# Patient Record
Sex: FEMALE | Race: WHITE | URBAN - METROPOLITAN AREA
[De-identification: names, ages, dates, MRNs, and addresses within clinical notes are randomized per-mention and may not be internally consistent; named-entity substitution may affect disease eponyms.]

---

## 2022-08-19 ENCOUNTER — OFFICE VISIT (OUTPATIENT)
Dept: URBAN - METROPOLITAN AREA MEDICAL CENTER 28 | Facility: MEDICAL CENTER | Age: 67
End: 2022-08-19
Payer: MEDICARE

## 2022-08-19 ENCOUNTER — LAB OUTSIDE AN ENCOUNTER (OUTPATIENT)
Dept: URBAN - METROPOLITAN AREA CLINIC 96 | Facility: CLINIC | Age: 67
End: 2022-08-19

## 2022-08-19 DIAGNOSIS — K31.89 ACQUIRED DEFORMITY OF DUODENUM: ICD-10-CM

## 2022-08-19 DIAGNOSIS — K22.89 DILATATION OF ESOPHAGUS: ICD-10-CM

## 2022-08-19 DIAGNOSIS — R63.4 ABNORMAL INTENTIONAL WEIGHT LOSS: ICD-10-CM

## 2022-08-19 DIAGNOSIS — R11.2 ACUTE NAUSEA WITH NONBILIOUS VOMITING: ICD-10-CM

## 2022-08-19 LAB
GLUCOSE POC: 86
PERFORMING LAB: (no result)

## 2022-08-19 PROCEDURE — 43239 EGD BIOPSY SINGLE/MULTIPLE: CPT | Performed by: INTERNAL MEDICINE

## 2023-01-09 ENCOUNTER — OUT OF OFFICE VISIT (OUTPATIENT)
Dept: URBAN - METROPOLITAN AREA MEDICAL CENTER 28 | Facility: MEDICAL CENTER | Age: 68
End: 2023-01-09
Payer: MEDICARE

## 2023-01-09 DIAGNOSIS — K63.3 APHTHOUS ULCER OF COLON: ICD-10-CM

## 2023-01-09 DIAGNOSIS — R63.0 ALMOST NO APPETITE: ICD-10-CM

## 2023-01-09 DIAGNOSIS — R19.7 ACUTE DIARRHEA: ICD-10-CM

## 2023-01-09 PROCEDURE — 45380 COLONOSCOPY AND BIOPSY: CPT | Performed by: INTERNAL MEDICINE

## 2023-01-09 PROCEDURE — 43239 EGD BIOPSY SINGLE/MULTIPLE: CPT | Performed by: INTERNAL MEDICINE

## 2023-01-26 ENCOUNTER — CLAIMS CREATED FROM THE CLAIM WINDOW (OUTPATIENT)
Dept: URBAN - METROPOLITAN AREA MEDICAL CENTER 28 | Facility: MEDICAL CENTER | Age: 68
End: 2023-01-26
Payer: MEDICARE

## 2023-01-26 ENCOUNTER — OUT OF OFFICE VISIT (OUTPATIENT)
Dept: URBAN - METROPOLITAN AREA MEDICAL CENTER 28 | Facility: MEDICAL CENTER | Age: 68
End: 2023-01-26

## 2023-01-26 DIAGNOSIS — D64.89 ANEMIA DUE TO OTHER CAUSE: ICD-10-CM

## 2023-01-26 DIAGNOSIS — K92.1 ACUTE MELENA: ICD-10-CM

## 2023-01-26 PROCEDURE — G8427 DOCREV CUR MEDS BY ELIG CLIN: HCPCS | Performed by: PHYSICIAN ASSISTANT

## 2023-01-26 PROCEDURE — 99223 1ST HOSP IP/OBS HIGH 75: CPT | Performed by: PHYSICIAN ASSISTANT

## 2023-01-27 ENCOUNTER — OUT OF OFFICE VISIT (OUTPATIENT)
Dept: URBAN - METROPOLITAN AREA MEDICAL CENTER 28 | Facility: MEDICAL CENTER | Age: 68
End: 2023-01-27

## 2023-01-27 ENCOUNTER — CLAIMS CREATED FROM THE CLAIM WINDOW (OUTPATIENT)
Dept: URBAN - METROPOLITAN AREA MEDICAL CENTER 28 | Facility: MEDICAL CENTER | Age: 68
End: 2023-01-27
Payer: MEDICARE

## 2023-01-27 DIAGNOSIS — K92.1 ACUTE MELENA: ICD-10-CM

## 2023-01-27 DIAGNOSIS — K63.3 COLON ULCER: ICD-10-CM

## 2023-01-27 DIAGNOSIS — K31.89 ACQUIRED DEFORMITY OF DUODENUM: ICD-10-CM

## 2023-01-27 DIAGNOSIS — K62.5 ANAL BLEEDING: ICD-10-CM

## 2023-01-27 PROCEDURE — 43239 EGD BIOPSY SINGLE/MULTIPLE: CPT | Performed by: INTERNAL MEDICINE

## 2023-01-27 PROCEDURE — 45380 COLONOSCOPY AND BIOPSY: CPT | Performed by: INTERNAL MEDICINE

## 2023-08-11 ENCOUNTER — TELEPHONE ENCOUNTER (OUTPATIENT)
Dept: URBAN - METROPOLITAN AREA CLINIC 12 | Facility: CLINIC | Age: 68
End: 2023-08-11

## 2023-08-14 PROBLEM — 2631000119108: Status: ACTIVE | Noted: 2023-08-14

## 2023-08-17 ENCOUNTER — LAB OUTSIDE AN ENCOUNTER (OUTPATIENT)
Dept: URBAN - METROPOLITAN AREA CLINIC 98 | Facility: CLINIC | Age: 68
End: 2023-08-17

## 2023-08-17 ENCOUNTER — OFFICE VISIT (OUTPATIENT)
Dept: URBAN - METROPOLITAN AREA CLINIC 98 | Facility: CLINIC | Age: 68
End: 2023-08-17
Payer: MEDICARE

## 2023-08-17 ENCOUNTER — WEB ENCOUNTER (OUTPATIENT)
Dept: URBAN - METROPOLITAN AREA CLINIC 98 | Facility: CLINIC | Age: 68
End: 2023-08-17

## 2023-08-17 VITALS
HEIGHT: 64 IN | BODY MASS INDEX: 31.24 KG/M2 | DIASTOLIC BLOOD PRESSURE: 57 MMHG | HEART RATE: 53 BPM | WEIGHT: 183 LBS | SYSTOLIC BLOOD PRESSURE: 107 MMHG | TEMPERATURE: 97.7 F

## 2023-08-17 DIAGNOSIS — R93.2 ABNORMAL FINDINGS ON DIAGNOSTIC IMAGING OF LIVER AND BILIARY TRACT: ICD-10-CM

## 2023-08-17 DIAGNOSIS — Z94.81 BONE MARROW REPLACED BY TRANSPLANT: ICD-10-CM

## 2023-08-17 PROCEDURE — 993 AGA: Performed by: INTERNAL MEDICINE

## 2023-08-17 PROCEDURE — 992 NON-BILLABLE: Performed by: INTERNAL MEDICINE

## 2023-08-17 NOTE — HPI-TODAY'S VISIT:
HEre on kind referral from Dr Rodrigues for abnormal liver imaging and ascites Brownyn her  is here  has had swelling since April in belly and legs no prior knowledge of liver disease still on ECP  family : twin sister w cirrhosis - heavy  alcohol,   20y ago  brothers with liver disease - . HCV and alcohol alcohol use was rare, she says  . on only lasix 80mg bid and juan 50mg daily. . having diarrhea x 1 wk : infectious workup neg.  10-15 bm /day

## 2023-08-17 NOTE — HPI-OTHER HISTORIES
7/2023 [facility]   REPORT ULTRASOUND RIGHT UPPER QUADRANT ABDOMEN  CLINICAL HISTORY: Elevated liver enzymes. Abnormal liver function tests. R 74.01  TECHNIQUE: Sonographic evaluation of the right upper quadrant of the abdomen was performed utilizing grayscale, color and spectral Doppler techniques.  COMPARISON: No prior study for comparison.  FINDINGS:  Liver:Normal homogeneous echotexture and contour. No definite focal masses. Normal hepatopedal flow documented in the portal vein. Flow also documented within a branch of the hepatic vein. The liver cnjnuntg80.5 cm long.  Intra and Extrahepatic Bile Ducts: Normal.  CBD measures 0.2 cm..   Gallbladder: Suspect a small amount of gallbladder sludge. No gallstones or pericholecystic fluid. No gallbladder wall thickening.  Pancreas: The visualized portions of pancreas appear normal. The pancreatic tail is obscured by bowel gas.  Right Kidney: Measures 11.1 cm long by 5.3 cm AP by 6.6 cm transverse with cortical thickness of 1.5 cm. Normal size and echogenicity. No hydronephrosis. No masses. No calculi.  Ascites: None.  Aorta and inferior vena: The aorta and inferior vena cava visualized in the upper abdomen appear normal. The proximal abdominal aorta measures 2.4 cm AP.  IMPRESSION:   Small amount of gallbladder sludge suspected. No cholelithiasis or biliary tract dilatation.

## 2023-08-18 ENCOUNTER — OUT OF OFFICE VISIT (OUTPATIENT)
Dept: URBAN - METROPOLITAN AREA MEDICAL CENTER 28 | Facility: MEDICAL CENTER | Age: 68
End: 2023-08-18
Payer: MEDICARE

## 2023-08-18 DIAGNOSIS — R18.8 ABDOMINAL ASCITES: ICD-10-CM

## 2023-08-18 DIAGNOSIS — Z94.84 H/O STEM CELL TRANSPLANT: ICD-10-CM

## 2023-08-18 DIAGNOSIS — K74.69 CIRRHOSIS, CRYPTOGENIC: ICD-10-CM

## 2023-08-18 DIAGNOSIS — K76.6 CLINICALLY SIGNIFICANT PORTAL HYPERTENSION: ICD-10-CM

## 2023-08-18 PROCEDURE — 99233 SBSQ HOSP IP/OBS HIGH 50: CPT | Performed by: INTERNAL MEDICINE

## 2023-08-18 PROCEDURE — G8427 DOCREV CUR MEDS BY ELIG CLIN: HCPCS | Performed by: INTERNAL MEDICINE

## 2023-08-18 PROCEDURE — 99223 1ST HOSP IP/OBS HIGH 75: CPT | Performed by: INTERNAL MEDICINE

## 2023-08-28 ENCOUNTER — OUT OF OFFICE VISIT (OUTPATIENT)
Dept: URBAN - METROPOLITAN AREA MEDICAL CENTER 28 | Facility: MEDICAL CENTER | Age: 68
End: 2023-08-28
Payer: MEDICARE

## 2023-08-28 DIAGNOSIS — T86.5 COMPLICATIONS OF STEM CELL TRANSPLANT: ICD-10-CM

## 2023-08-28 DIAGNOSIS — Z94.84 H/O STEM CELL TRANSPLANT: ICD-10-CM

## 2023-08-28 DIAGNOSIS — K74.69 CIRRHOSIS, CRYPTOGENIC: ICD-10-CM

## 2023-08-28 DIAGNOSIS — D89.811 CHRONIC GRAFT-VERSUS-HOST DISEASE: ICD-10-CM

## 2023-08-28 DIAGNOSIS — R18.8 ABDOMINAL ASCITES: ICD-10-CM

## 2023-08-28 PROCEDURE — 99232 SBSQ HOSP IP/OBS MODERATE 35: CPT | Performed by: INTERNAL MEDICINE

## 2023-09-07 ENCOUNTER — OFFICE VISIT (OUTPATIENT)
Dept: URBAN - METROPOLITAN AREA CLINIC 98 | Facility: CLINIC | Age: 68
End: 2023-09-07
Payer: MEDICARE

## 2023-09-07 VITALS
TEMPERATURE: 98.2 F | HEART RATE: 64 BPM | DIASTOLIC BLOOD PRESSURE: 53 MMHG | HEIGHT: 64 IN | BODY MASS INDEX: 24.24 KG/M2 | SYSTOLIC BLOOD PRESSURE: 117 MMHG | WEIGHT: 142 LBS

## 2023-09-07 DIAGNOSIS — Z94.81 BONE MARROW REPLACED BY TRANSPLANT: ICD-10-CM

## 2023-09-07 DIAGNOSIS — D89.811 CHRONIC GVHD: ICD-10-CM

## 2023-09-07 DIAGNOSIS — R18.8 ASCITES: ICD-10-CM

## 2023-09-07 DIAGNOSIS — K76.82 HEPATIC ENCEPHALOPATHY: ICD-10-CM

## 2023-09-07 DIAGNOSIS — K74.60 CIRRHOSIS: ICD-10-CM

## 2023-09-07 PROCEDURE — 99214 OFFICE O/P EST MOD 30 MIN: CPT | Performed by: INTERNAL MEDICINE

## 2023-09-07 RX ORDER — RIFAXIMIN 550 MG/1
1 TABLET TABLET ORAL TWICE A DAY
Qty: 180 TABLET | Refills: 3 | OUTPATIENT
Start: 2023-09-08 | End: 2024-09-02

## 2023-09-07 NOTE — HPI-TODAY'S VISIT:
Returns to the office accompanied by Bronwyn seen during her almost 3 wk hospital stay for volume overload  during that time with high NH3 and confusion that was mild and transient  Did have a TJ liver bx w mild portal hypertension, cirrhosis w iron overload, and findings of chronic GVHD   feels well mentation near normal, Bronwyn reports ; she says she has no fluid retnetion and is ambulating well and eating well

## 2023-09-08 ENCOUNTER — TELEPHONE ENCOUNTER (OUTPATIENT)
Dept: URBAN - METROPOLITAN AREA CLINIC 98 | Facility: CLINIC | Age: 68
End: 2023-09-08

## 2023-10-13 ENCOUNTER — OFFICE VISIT (OUTPATIENT)
Dept: URBAN - METROPOLITAN AREA CLINIC 98 | Facility: CLINIC | Age: 68
End: 2023-10-13
Payer: MEDICARE

## 2023-10-13 VITALS
HEART RATE: 47 BPM | TEMPERATURE: 97.4 F | SYSTOLIC BLOOD PRESSURE: 101 MMHG | HEIGHT: 64 IN | BODY MASS INDEX: 21.51 KG/M2 | DIASTOLIC BLOOD PRESSURE: 49 MMHG | WEIGHT: 126 LBS

## 2023-10-13 DIAGNOSIS — D89.811 CHRONIC GVHD: ICD-10-CM

## 2023-10-13 DIAGNOSIS — K76.82 HEPATIC ENCEPHALOPATHY: ICD-10-CM

## 2023-10-13 DIAGNOSIS — K74.60 CIRRHOSIS: ICD-10-CM

## 2023-10-13 DIAGNOSIS — Z94.81 BONE MARROW REPLACED BY TRANSPLANT: ICD-10-CM

## 2023-10-13 DIAGNOSIS — R18.8 ASCITES: ICD-10-CM

## 2023-10-13 PROCEDURE — 99214 OFFICE O/P EST MOD 30 MIN: CPT | Performed by: INTERNAL MEDICINE

## 2023-10-13 RX ORDER — LACTULOSE 10 G/15ML
30 ML SOLUTION ORAL ONCE A DAY
Qty: 2700 ML | Refills: 1 | OUTPATIENT
Start: 2023-10-13 | End: 2024-04-10

## 2023-10-13 NOTE — HPI-TODAY'S VISIT:
Herewith Bronwyn to follow up  still has a few more ECP sessions for GVHD  recalls being told her liver tests are improving  No confusion since leaving hospital out of lactulose; remains on Xifaxan

## 2023-10-16 ENCOUNTER — TELEPHONE ENCOUNTER (OUTPATIENT)
Dept: URBAN - METROPOLITAN AREA CLINIC 98 | Facility: CLINIC | Age: 68
End: 2023-10-16

## 2023-10-16 VITALS — HEIGHT: 64 IN

## 2024-01-10 ENCOUNTER — DASHBOARD ENCOUNTERS (OUTPATIENT)
Age: 69
End: 2024-01-10

## 2024-01-11 ENCOUNTER — LAB OUTSIDE AN ENCOUNTER (OUTPATIENT)
Dept: URBAN - METROPOLITAN AREA CLINIC 98 | Facility: CLINIC | Age: 69
End: 2024-01-11

## 2024-01-11 ENCOUNTER — OFFICE VISIT (OUTPATIENT)
Dept: URBAN - METROPOLITAN AREA CLINIC 98 | Facility: CLINIC | Age: 69
End: 2024-01-11
Payer: MEDICARE

## 2024-01-11 VITALS
WEIGHT: 130 LBS | SYSTOLIC BLOOD PRESSURE: 96 MMHG | DIASTOLIC BLOOD PRESSURE: 54 MMHG | BODY MASS INDEX: 22.2 KG/M2 | HEART RATE: 53 BPM | HEIGHT: 64 IN | TEMPERATURE: 97.8 F

## 2024-01-11 DIAGNOSIS — D89.811 CHRONIC GVHD: ICD-10-CM

## 2024-01-11 DIAGNOSIS — R18.8 ASCITES: ICD-10-CM

## 2024-01-11 DIAGNOSIS — K74.60 CIRRHOSIS: ICD-10-CM

## 2024-01-11 DIAGNOSIS — Z94.81 BONE MARROW REPLACED BY TRANSPLANT: ICD-10-CM

## 2024-01-11 DIAGNOSIS — K76.82 HEPATIC ENCEPHALOPATHY: ICD-10-CM

## 2024-01-11 PROCEDURE — 99214 OFFICE O/P EST MOD 30 MIN: CPT | Performed by: INTERNAL MEDICINE

## 2024-01-11 RX ORDER — LACTULOSE 10 G/15ML
30 ML SOLUTION ORAL ONCE A DAY
Qty: 2700 ML | Refills: 1 | Status: ACTIVE | COMMUNITY
Start: 2023-10-13 | End: 2024-04-10

## 2024-01-11 NOTE — HPI-TODAY'S VISIT:
Here alone to follow up  not a lot of fluid buildup any more  she has brought her labs from BMT dated 1/8 na 136 cr 1 alb 3.5 tbil 0.9 alp 358 ast 45 alt 34 hb 9.4 plt 80

## 2024-04-11 ENCOUNTER — OV EP (OUTPATIENT)
Dept: URBAN - METROPOLITAN AREA CLINIC 98 | Facility: CLINIC | Age: 69
End: 2024-04-11

## 2024-05-02 ENCOUNTER — TELEPHONE ENCOUNTER (OUTPATIENT)
Dept: URBAN - METROPOLITAN AREA CLINIC 98 | Facility: CLINIC | Age: 69
End: 2024-05-02

## 2024-05-02 RX ORDER — RIFAXIMIN 550 MG/1
1 TABLET TABLET ORAL TWICE A DAY
Qty: 180 | Refills: 3
Start: 2023-09-08 | End: 2025-04-27

## 2024-05-03 ENCOUNTER — TELEPHONE ENCOUNTER (OUTPATIENT)
Dept: URBAN - METROPOLITAN AREA CLINIC 98 | Facility: CLINIC | Age: 69
End: 2024-05-03

## 2024-06-24 ENCOUNTER — LAB OUTSIDE AN ENCOUNTER (OUTPATIENT)
Dept: URBAN - METROPOLITAN AREA CLINIC 98 | Facility: CLINIC | Age: 69
End: 2024-06-24

## 2024-06-24 ENCOUNTER — OFFICE VISIT (OUTPATIENT)
Dept: URBAN - METROPOLITAN AREA CLINIC 98 | Facility: CLINIC | Age: 69
End: 2024-06-24
Payer: MEDICARE

## 2024-06-24 VITALS
TEMPERATURE: 97.3 F | WEIGHT: 138 LBS | DIASTOLIC BLOOD PRESSURE: 56 MMHG | SYSTOLIC BLOOD PRESSURE: 107 MMHG | BODY MASS INDEX: 23.56 KG/M2 | HEART RATE: 51 BPM | HEIGHT: 64 IN

## 2024-06-24 DIAGNOSIS — K74.69 CIRRHOSIS, CRYPTOGENIC: ICD-10-CM

## 2024-06-24 DIAGNOSIS — Z94.81 BONE MARROW REPLACED BY TRANSPLANT: ICD-10-CM

## 2024-06-24 PROCEDURE — 99214 OFFICE O/P EST MOD 30 MIN: CPT | Performed by: INTERNAL MEDICINE

## 2024-06-24 RX ORDER — LACTULOSE 10 G/15ML
TAKE 30ML BY MOUTH ONE TIME DAILY SOLUTION ORAL
Qty: 2700 MILLILITER | Refills: 1 | Status: ACTIVE | COMMUNITY

## 2024-06-24 RX ORDER — RIFAXIMIN 550 MG/1
1 TABLET TABLET ORAL TWICE A DAY
Qty: 180 | Refills: 3 | Status: ACTIVE | COMMUNITY
Start: 2023-09-08 | End: 2025-04-27

## 2024-06-24 NOTE — HPI-TODAY'S VISIT:
Here for routine follow up  she reports that she is told now to being in remission  off ECP feels well  did not bring medication list but thinks she is only on lasix for diuretic  feels that her belly is same size as always

## 2024-06-25 LAB
AFP, SERUM, TUMOR MARKER: 4.4
ALBUMIN: 4.1
ALKALINE PHOSPHATASE: 209
ALT (SGPT): 26
AST (SGOT): 39
BASO (ABSOLUTE): 0
BASOS: 1
BILIRUBIN, TOTAL: 0.6
BUN/CREATININE RATIO: 34
BUN: 44
CALCIUM: 9.1
CARBON DIOXIDE, TOTAL: 24
CHLORIDE: 99
CREATININE: 1.29
EGFR: 44
EOS (ABSOLUTE): 0.1
EOS: 5
GLOBULIN, TOTAL: 2.5
GLUCOSE: 222
HEMATOCRIT: 25.5
HEMATOLOGY COMMENTS:: (no result)
HEMOGLOBIN: 8.7
IMMATURE CELLS: (no result)
IMMATURE GRANS (ABS): 0
IMMATURE GRANULOCYTES: 1
INR: 1.2
LYMPHS (ABSOLUTE): 0.6
LYMPHS: 27
MAGNESIUM: 2.3
MCH: 36.3
MCHC: 34.1
MCV: 106
MONOCYTES(ABSOLUTE): 0.3
MONOCYTES: 13
NEUTROPHILS (ABSOLUTE): 1.2
NEUTROPHILS: 53
NRBC: (no result)
PLATELETS: 53
POTASSIUM: 5.1
PROTEIN, TOTAL: 6.6
PROTHROMBIN TIME: 12.1
RBC: 2.4
RDW: 13.7
SODIUM: 135
WBC: 2.2

## 2024-07-02 ENCOUNTER — LAB OUTSIDE AN ENCOUNTER (OUTPATIENT)
Dept: URBAN - METROPOLITAN AREA CLINIC 98 | Facility: CLINIC | Age: 69
End: 2024-07-02

## 2024-07-02 LAB
CREATININE POC: 1.2
PERFORMING LAB: (no result)

## 2024-07-15 ENCOUNTER — TELEPHONE ENCOUNTER (OUTPATIENT)
Dept: URBAN - METROPOLITAN AREA CLINIC 98 | Facility: CLINIC | Age: 69
End: 2024-07-15

## 2024-09-23 ENCOUNTER — OFFICE VISIT (OUTPATIENT)
Dept: URBAN - METROPOLITAN AREA CLINIC 98 | Facility: CLINIC | Age: 69
End: 2024-09-23
Payer: MEDICARE

## 2024-09-23 VITALS
HEIGHT: 64 IN | WEIGHT: 137 LBS | BODY MASS INDEX: 23.39 KG/M2 | HEART RATE: 55 BPM | TEMPERATURE: 98 F | SYSTOLIC BLOOD PRESSURE: 104 MMHG | DIASTOLIC BLOOD PRESSURE: 55 MMHG

## 2024-09-23 DIAGNOSIS — R18.8 ASCITES: ICD-10-CM

## 2024-09-23 DIAGNOSIS — K74.69 CIRRHOSIS, CRYPTOGENIC: ICD-10-CM

## 2024-09-23 DIAGNOSIS — K76.82 HEPATIC ENCEPHALOPATHY: ICD-10-CM

## 2024-09-23 DIAGNOSIS — Z94.81 BONE MARROW REPLACED BY TRANSPLANT: ICD-10-CM

## 2024-09-23 PROCEDURE — 99214 OFFICE O/P EST MOD 30 MIN: CPT | Performed by: INTERNAL MEDICINE

## 2024-09-23 RX ORDER — RIFAXIMIN 550 MG/1
1 TABLET TABLET ORAL TWICE A DAY
Qty: 180 | Refills: 3 | Status: ACTIVE | COMMUNITY
Start: 2023-09-08 | End: 2025-04-27

## 2024-09-23 RX ORDER — LACTULOSE 10 G/15ML
TAKE 30ML BY MOUTH ONE TIME DAILY SOLUTION ORAL
Qty: 2700 MILLILITER | Refills: 1 | Status: ACTIVE | COMMUNITY

## 2024-09-23 NOTE — HPI-TODAY'S VISIT:
Here to follow up  got fluids at last BMT visit  belly is normal sized 9/20 labs Cr1.5 na 134  alb 3.8  tbili 0.8 alp 199 ast 37 alt 27 hb 8.4 plt 69  will stop jakafi at end of the month  only taking lasix PRN (which is rare)  lactulose 30ml daily (3-4 bm daily

## 2024-11-02 ENCOUNTER — ERX REFILL RESPONSE (OUTPATIENT)
Dept: URBAN - METROPOLITAN AREA CLINIC 98 | Facility: CLINIC | Age: 69
End: 2024-11-02

## 2024-11-02 RX ORDER — LACTULOSE 10 G/15ML
TAKE 30ML BY MOUTH ONE TIME DAILY SOLUTION ORAL
Qty: 2700 MILLILITER | Refills: 1 | OUTPATIENT

## 2024-12-16 ENCOUNTER — OFFICE VISIT (OUTPATIENT)
Dept: URBAN - METROPOLITAN AREA CLINIC 98 | Facility: CLINIC | Age: 69
End: 2024-12-16

## 2025-01-31 ENCOUNTER — OFFICE VISIT (OUTPATIENT)
Dept: URBAN - METROPOLITAN AREA CLINIC 98 | Facility: CLINIC | Age: 70
End: 2025-01-31

## 2025-01-31 VITALS
HEART RATE: 74 BPM | BODY MASS INDEX: 23.66 KG/M2 | DIASTOLIC BLOOD PRESSURE: 61 MMHG | WEIGHT: 138.6 LBS | SYSTOLIC BLOOD PRESSURE: 160 MMHG | HEIGHT: 64 IN | TEMPERATURE: 96.9 F

## 2025-01-31 RX ORDER — RIFAXIMIN 550 MG/1
1 TABLET TABLET ORAL TWICE A DAY
Qty: 180 | Refills: 3 | Status: ACTIVE | COMMUNITY
Start: 2023-09-08 | End: 2025-04-27

## 2025-01-31 RX ORDER — LACTULOSE 10 G/15ML
TAKE 30ML BY MOUTH ONE TIME DAILY SOLUTION ORAL
Qty: 2700 MILLILITER | Refills: 1 | Status: ACTIVE | COMMUNITY

## 2025-01-31 NOTE — HPI-TODAY'S VISIT:
Yesterday when she urinated - all blood  stool is brown and loose but sees BRB  stomach is sore in lower area  has been coughing a lot and muscle gets sore from coughing BM has been loose for a few months  she thinks that she might be on a blood thinner

## 2025-02-01 ENCOUNTER — CLAIMS CREATED FROM THE CLAIM WINDOW (OUTPATIENT)
Dept: URBAN - METROPOLITAN AREA MEDICAL CENTER 28 | Facility: MEDICAL CENTER | Age: 70
End: 2025-02-01
Payer: MEDICARE

## 2025-02-01 DIAGNOSIS — K62.5 ANAL BLEEDING: ICD-10-CM

## 2025-02-01 DIAGNOSIS — R31.9 HEMATURIA, UNSPECIFIED TYPE: ICD-10-CM

## 2025-02-01 DIAGNOSIS — R93.3 ABN FINDINGS-GI TRACT: ICD-10-CM

## 2025-02-01 DIAGNOSIS — D64.89 ANEMIA DUE TO OTHER CAUSE: ICD-10-CM

## 2025-02-01 PROCEDURE — 99222 1ST HOSP IP/OBS MODERATE 55: CPT | Performed by: INTERNAL MEDICINE

## 2025-02-01 PROCEDURE — 99254 IP/OBS CNSLTJ NEW/EST MOD 60: CPT | Performed by: INTERNAL MEDICINE

## 2025-02-02 ENCOUNTER — CLAIMS CREATED FROM THE CLAIM WINDOW (OUTPATIENT)
Dept: URBAN - METROPOLITAN AREA MEDICAL CENTER 28 | Facility: MEDICAL CENTER | Age: 70
End: 2025-02-02
Payer: MEDICARE

## 2025-02-02 DIAGNOSIS — K62.5 ANAL BLEEDING: ICD-10-CM

## 2025-02-02 DIAGNOSIS — R93.3 ABN FINDINGS-GI TRACT: ICD-10-CM

## 2025-02-02 DIAGNOSIS — D64.89 ANEMIA DUE TO OTHER CAUSE: ICD-10-CM

## 2025-02-02 PROCEDURE — 99232 SBSQ HOSP IP/OBS MODERATE 35: CPT | Performed by: INTERNAL MEDICINE

## 2025-02-03 ENCOUNTER — CLAIMS CREATED FROM THE CLAIM WINDOW (OUTPATIENT)
Dept: URBAN - METROPOLITAN AREA MEDICAL CENTER 28 | Facility: MEDICAL CENTER | Age: 70
End: 2025-02-03
Payer: MEDICARE

## 2025-02-03 DIAGNOSIS — K62.5 ANAL BLEEDING: ICD-10-CM

## 2025-02-03 PROCEDURE — 45380 COLONOSCOPY AND BIOPSY: CPT | Performed by: INTERNAL MEDICINE

## 2025-02-03 PROCEDURE — 45378 DIAGNOSTIC COLONOSCOPY: CPT | Performed by: INTERNAL MEDICINE

## 2025-02-04 ENCOUNTER — CLAIMS CREATED FROM THE CLAIM WINDOW (OUTPATIENT)
Dept: URBAN - METROPOLITAN AREA MEDICAL CENTER 28 | Facility: MEDICAL CENTER | Age: 70
End: 2025-02-04
Payer: MEDICARE

## 2025-02-04 DIAGNOSIS — K62.5 ANAL BLEEDING: ICD-10-CM

## 2025-02-04 PROCEDURE — 99232 SBSQ HOSP IP/OBS MODERATE 35: CPT
